# Patient Record
Sex: MALE | URBAN - METROPOLITAN AREA
[De-identification: names, ages, dates, MRNs, and addresses within clinical notes are randomized per-mention and may not be internally consistent; named-entity substitution may affect disease eponyms.]

---

## 2020-05-25 ENCOUNTER — COMMUNICATION - HEALTHEAST (OUTPATIENT)
Dept: SCHEDULING | Facility: CLINIC | Age: 40
End: 2020-05-25

## 2021-06-08 NOTE — TELEPHONE ENCOUNTER
RN Triage:    Patient is calling about COVID-like symptoms    Patient's coworker had a confirmed case of COVID - he was quarantined and is now back to work.    Patient now has some symptoms which include:  -Very mild cough that started about a week ago  - Fever - 100.2 this morning    Says he wasn't feeling well this morning so he took his temp and noticed it was high.    Is not a healthcare employee or  provider.    Denies shortness of breath, headache, sore throat, loss of taste or smell    Does take medications for high blood pressure    PLAN:  Per protocol, patient should monitor his symptoms at home. Patient also requesting testing information. All Care Advice and testing info given. Patient is to call back if his symptoms get worse or if other questions arise.      Rosaline Aleman RN      Reason for Disposition    [1] COVID-19 infection diagnosed or suspected AND [2] mild symptoms (fever, cough) AND [3] no trouble breathing or other complications    [facility] Specific Disposition  - REQUIRED: [facility] Specific Patient Instructions  COVID 19 Nurse Triage Plan/Patient Instructions    Please be aware that novel coronavirus (COVID-19) may be circulating in the community. If you develop symptoms such as fever, cough, or SOB or if you have concerns about the presence of another infection including coronavirus (COVID-19), please contact your health care provider or visit [email or web address].       Patient to stay at home and follow home care protocol based instructions. and Additional COVID19 information to add for patients.       Additional General Information About COVID-19    Whether or not you've been tested for COVID-19    Stay home and away from others (self-isolate) until:  At least 10 days have passed since your symptoms started. And   You've had no fever--and no medicine that reduces fever--for 3 full days (72 hours). And    Your other symptoms have resolved (gotten better).     During  this time:  Stay in your own room (and use your own bathroom), if you can.  Stay away from others in your home. No hugging, kissing or shaking hands.  No visitors.  Don't go to work, school or anywhere else.   Clean  high touch  surfaces often (doorknobs, counters, handles, etc.). Use a household cleaning spray or wipes.  Cover your mouth and nose with a mask, tissue or wash cloth to avoid spreading germs.  Wash your hands and face often. Use soap and water.  For more tips, go to https://www.cdc.gov/coronavirus/2019-ncov/downloads/10Things.pdf.    How can I take care of myself?    Get lots of rest. Drink extra fluids (unless a doctor has told you not to).     Take Tylenol (acetaminophen) for fever or pain. If you have liver or kidney problems, ask your family doctor if it's okay to take Tylenol.     Adults can take either:   650 mg (two 325 mg pills) every 4 to 6 hours, or   1,000 mg (two 500 mg pills) every 8 hours as needed.   Note: Don't take more than 3,000 mg in one day.   Acetaminophen is found in many medicines (both prescribed and over-the-counter medicines). Read all labels to be sure you don't take too much.   For children, check the Tylenol bottle for the right dose. The dose is based on  the child's age or weight.    If you have other health problems (like cancer, heart failure, an organ transplant or severe kidney disease): Call your specialty clinic if you don't feel better in the next 2 days.    Know when to call 911: If your breathing is so bad that it keeps you from doing normal activities, call 911 or go to the emergency room. Tell them that you've been staying home and may have COVID-19..      What are the symptoms of COVID-19?   The most common symptoms are cough, fever and trouble breathing.   Less common symptoms include body aches, chills, diarrhea (loose, watery poops), fatigue (feeling very tired), headache, runny nose, sore throat and loss of smell.   COVID-19 can cause severe coughing  (bronchitis) and lung infection (pneumonia).    How does it spread?   The virus may spread when a person coughs or sneezes into the air. The virus can travel about 6 feet this way, and it can live on surfaces.    Common  (household disinfectants) will kill the virus.    Who is at risk?  Anyone can catch COVID-19 if they're around someone who has the virus.    How can others protect themselves?   Stay away from people who have COVID-19 (or symptoms of COVID-19).  Wash hands often with soap and water. Or, use hand  with at least 60% alcohol.  Avoid touching the eyes, nose or mouth.   Wear a face mask when you go out in public, when sick or when caring for a sick person.      For more about COVID-19 and caring for yourself at home, please visit the CDC website at https://www.cdc.gov/coronavirus/2019-ncov/about/steps-when-sick.html.     To learn about care at [facility], go to [email or web address].    Below are the COVID-19 hotlines at the [place] Department of [facility] ([facility]). Interpreters are available.   For health questions: Call 007-063-5635 or 1-430.637.6477 (7 a.m. to 7 p.m.)  For questions about schools and childcare: Call 552-412-9249 or 1-880.179.8829 (7 a.m. to 7 p.m.)        Thank you for limiting contact with others, wearing a simple mask to cover your cough, practice good hand hygiene habits and accessing our virtual services where possible to limit the spread of this virus.    For more information about COVID19 and options for caring for yourself at home, please visit the CDC website at https://www.cdc.gov/coronavirus/2019-ncov/about/steps-when-sick.html  For more options for care at [facility], please visit our website at [email or web address]    For more information, please use the [place] Department of [facility] ([facility]) COVID-19 Hotlines (Interpreters available):   Health questions: Phone Number: 732.558.5649 or  1-346.487.8957 and Hours: 7 a.m. to 7 p.m.  Schools and  questions: Phone Number: [phone] or 1-893.577.4007 and Hours 7 a.m. to 7 p.m.    Protocols used: CORONAVIRUS (COVID-19) DIAGNOSED OR [id number] 4.22.20

## 2023-09-09 ENCOUNTER — HOSPITAL ENCOUNTER (EMERGENCY)
Facility: CLINIC | Age: 43
Discharge: HOME | End: 2023-09-09
Payer: COMMERCIAL

## 2023-09-09 VITALS
HEART RATE: 59 BPM | RESPIRATION RATE: 14 BRPM | TEMPERATURE: 97.4 F | SYSTOLIC BLOOD PRESSURE: 136 MMHG | DIASTOLIC BLOOD PRESSURE: 87 MMHG | WEIGHT: 195 LBS | OXYGEN SATURATION: 100 %

## 2023-09-09 DIAGNOSIS — K04.7 DENTAL INFECTION: ICD-10-CM

## 2023-09-09 DIAGNOSIS — K08.89 PAIN, DENTAL: ICD-10-CM

## 2023-09-09 PROCEDURE — 99284 EMERGENCY DEPT VISIT MOD MDM: CPT | Mod: 25

## 2023-09-09 PROCEDURE — 250N000013 HC RX MED GY IP 250 OP 250 PS 637

## 2023-09-09 PROCEDURE — 250N000009 HC RX 250

## 2023-09-09 PROCEDURE — 64400 NJX AA&/STRD TRIGEMINAL NRV: CPT

## 2023-09-09 RX ORDER — OXYCODONE HYDROCHLORIDE 5 MG/1
5 TABLET ORAL EVERY 6 HOURS PRN
Qty: 7 TABLET | Refills: 0 | Status: SHIPPED | OUTPATIENT
Start: 2023-09-09 | End: 2023-09-12

## 2023-09-09 RX ORDER — BUPIVACAINE HYDROCHLORIDE AND EPINEPHRINE 5; 5 MG/ML; UG/ML
1.8 INJECTION, SOLUTION PERINEURAL ONCE
Status: COMPLETED | OUTPATIENT
Start: 2023-09-09 | End: 2023-09-09

## 2023-09-09 RX ADMIN — AMOXICILLIN AND CLAVULANATE POTASSIUM 1 TABLET: 875; 125 TABLET, FILM COATED ORAL at 09:56

## 2023-09-09 RX ADMIN — BUPIVACAINE HYDROCHLORIDE AND EPINEPHRINE BITARTRATE 1.8 ML: 5; .005 INJECTION, SOLUTION SUBCUTANEOUS at 09:56

## 2023-09-09 ASSESSMENT — ENCOUNTER SYMPTOMS
TROUBLE SWALLOWING: 0
CHILLS: 0
FEVER: 0

## 2023-09-09 NOTE — DISCHARGE INSTRUCTIONS
You were seen in the ER for dental pain.  You were given a dental injection to help with the pain.  We will send you home on an antibiotic called Augmentin to help with the infection.  Please take this twice a day for 9 days.  Continue to take Tylenol and ibuprofen for the pain, however we will send you home with oxycodone for breakthrough pain.  Ultimately, this will need to be drained by a dentist.  Please continue to try to call your dentist, however we did place a referral for a dentist within the [facility] system, they will hopefully call you to make an appointment.  Otherwise, you may call or go to any of the local clinics listed below.  Follow-up with dentist and return to the ER if you have worsening pain, swelling, fevers, chills, difficulty speaking or swallowing or any other concerning symptoms.    Dental Clinics with no or reduced fees    [facility]   The Dental Emergency Room  [address] [facility], [facility]  [phone] Accepts [place]    [facility]  [address], [facility]  [phone] No Fee Hours and services vary each month - call them before going.  Sometimes only offer extractions.   [facility]  [address], [facility]  [phone] Sliding fee: ER visit - $50 up front  regular - $35 up front Call or arrive at 7:45 AM on Mondays, Tues, or Thursdays to sign up for same-day appointments for dental pain / emergencies.        [facility] Sliding  fee  Call for details Walk-ins and same-day appointments  Walk-in's accepted 8-11AM and 1-4PM  Monday-Friday   [address]     [phone]          [facility] dental Sliding fee If no insurance, call first.    [address], [place]     [phone]          [facility]  [address], [facility]  [phone] Sliding fee Call 8:00 AM Mon-Thurs for next-day  appointments (for emergencies/pain only) Help with [place] is available.        [facility]  [address]  [phone] Call for fees Walk-in appointments available from 8am-3:30pm.  Standard appointments also available.              [facility]  [address]  [address]  [phone] No cost Open Monday- Thursday, 8am-9pm        [facility]   [address]  [phone] Sliding fee  $40 up front No walk-ins. Call at 8AM Mon-Fri for same- day visits.  Can schedule Saturday emergency visits by calling Friday morning.   [facility]  [address]  [phone] Sliding fee  Call for details No walk-ins.  For emergency appointments:  Call on Thursday 3PM (for Friday appt) OR Call on Friday 3PM (for Monday appt)   [facility]  [address] [place] [address]  [phone] Sliding fee- Call for Details Mon, Tues 7am-5:30pm  Wed 7am-8pm  Thurs 7am-7pm  Fri 8am-5pm   [facility]  [address] [place] [address]  [phone] Sliding fee -   Call for details Mon, Tues, Thurs, Fri- 8am-4:30 PM  Wed 8:30 AM to 8 pm             OTHER RESOURCES       Emergency Dental [facility],   [address] [address]  [address] [place]  [address]  [phone]    Referral Service, 1-800-DENTIST        Open 9a to 9p 7 days a week               [employer], 1-197.208.5546 For people who are elderly, disabled, or medically compromised and have no other way to pay for dental care. Call to get an application. If approved, services are provided through volunteer dentists.        REVISED 2018-10

## 2023-09-09 NOTE — ED PROVIDER NOTES
EMERGENCY DEPARTMENT ENCOUNTER      NAME: Chu Olson  AGE: 42 year old male  YOB: 1980  MRN: [id number]  EVALUATION DATE & TIME: 9/9/2023  9:16 AM    PCP: No primary care provider on file.    ED PROVIDER: Abby Lee PA-C    Chief Complaint   Patient presents with    Dental Pain    Facial Swelling     FINAL IMPRESSION:  1. Pain, dental    2. Dental infection      MEDICAL DECISION MAKING:    Pertinent Labs & Imaging studies reviewed. (See chart for details)  Chu Olson is a 42 year old male who presents for evaluation of right lower tooth pain and facial swelling x 1 week. Tried all week to get in to see his dentist, but no appointment available so came to the ER today for further evaluation. No known injury. Has been using Orajel with temporary relief.     On my initial evaluation, vital signs normal. On physical exam patient is awake, alert, no acute distress, resting comfortably at edge of bed.  Does not appear overly uncomfortable appearing, not ill or toxic.  On inspection of his mouth, he has poor dentition with many teeth with crowns and dental caries.  He is tender over his tooth #30 and 31 with surrounding erythema to the gumline.  He does have right-sided mild facial swelling and is tender on palpation, but no erythema or fluctuance.  No difficulty opening or closing jaw.  No tenderness beneath his tongue or mandible.  No difficulty tolerating secretions or speaking.    Differential diagnosis includes dental caries, periapical abscess, deep space abscess, gingivitis, Ludewig's, TMJ, parotitis,.     Suspect this is dental caries with likely periapical abscess ultimately will need drained by a dentist.  No obvious fluctuance that would require I&D here in the ER today.  In the meantime, I offered a dental block, which patient accepted.  Felt immediate relief after alveolar nerve block.  Will send home on Augmentin and given referral for dental clinics for possible walk-in  evaluation.  Patient is otherwise clinically well-appearing, do not suspect deeper space infection or Konstantin's that would require further CT imaging today.  Sent home with Augmentin and oxycodone.  Plan for dental follow-up and provided strict return precautions.    Patient has had serial examinations and notes significant improvement.     Patient was discharged in stable condition with treatment plan as below. Instructed to follow up with primary care provider in 3 days and with dentist and return to the emergency department with any new or worsening of symptoms. Patient expressed understanding, feels comfortable, and is in agreement with this plan. All questions addressed prior to discharge.    Medical Decision Making    History:  Supplemental history from: Family Member/Significant Other  External Record(s) reviewed: Documented in chart, if applicable.    Work Up:  Chart documentation includes differential considered and any EKGs or imaging independently interpreted by provider, where specified.  In additional to work up documented, I considered the following work up: Documented in chart, if applicable.    External consultation:  Discussion of management with another provider: Documented in chart, if applicable    Complicating factors:  Care impacted by chronic illness: N/A  Care affected by social determinants of health: N/A    Disposition considerations: Discharge. I prescribed additional prescription strength medication(s) as charted. N/A.    ED COURSE:  9:17 AM  I reviewed the patient's chart. I met with the patient to gather history and to perform my initial exam.   9:59 AM I rechecked the patient and performed dental block.  We discussed plan for discharge including treatment plan, follow-up and return precautions to emergency department.  Patient voiced understanding and in agreement with this plan.    At the conclusion of the encounter I discussed the results of all of the tests and the disposition. The  "questions were answered. The patient or family acknowledged understanding and was agreeable with the care plan.     Voice recognition software was used in the creation of this note. Any grammatical or nonsensical errors are due to inherent errors with the software and are not the intention of the writer.     MEDICATIONS GIVEN IN THE EMERGENCY:  Medications   BUPivacaine 0.5 % EPINEPHrine 1:200,000 0.5% -1:[id number] dental injection SOLN 1.8 mL (1.8 mLs Intradermal $Given by Other Clinician 9/9/23 0956)   amoxicillin-clavulanate (AUGMENTIN) 875-125 MG per tablet 1 tablet (1 tablet Oral $Given 9/9/23 0956)       NEW PRESCRIPTIONS STARTED AT TODAY'S ER VISIT  New Prescriptions    AMOXICILLIN-CLAVULANATE (AUGMENTIN) 875-125 MG TABLET    Take 1 tablet by mouth 2 times daily for 9 days    OXYCODONE (ROXICODONE) 5 MG TABLET    Take 1 tablet (5 mg) by mouth every 6 hours as needed for pain     =================================================================    HPI:    Patient information was obtained from: Patient and patient's family member    Use of Interpretor: N/A         Chu Olson is a 42 year old male with no pertinent history who presents to this ED by walk in for evaluation of dental pain and gum swelling.    For the past 5 months, the patient has had left bottom tooth pain due to a \"bump\" on his gums and has had difficulty biting down. He was seen by his dentist for this complaint, who said that the \"bump\" would self resolve. The patient states that his left bottom tooth pain has been constant since.     On 9/3/23, the patient noticed his right bottom gums swelling up and endorses constant pain rated an 8 out of 10. He has applied oral gels and ice packs to the area with some pain relief. He denies fever, chills, trouble swallowing or any other complaints at this time. The patient has tried to be seen by a dentist but does not have an appointment scheduled due to dentist unavailability.    REVIEW OF " SYSTEMS:  Review of Systems   Constitutional:  Negative for chills and fever.   HENT:  Positive for dental problem. Negative for trouble swallowing.         Positive for bilateral gum pain and bilateral gum swelling.    All other systems reviewed and are negative.       PAST MEDICAL HISTORY:  History reviewed. No pertinent past medical history.    PAST SURGICAL HISTORY:  History reviewed. No pertinent surgical history.    CURRENT MEDICATIONS:    No current facility-administered medications for this encounter.    Current Outpatient Medications:     amoxicillin-clavulanate (AUGMENTIN) 875-125 MG tablet, Take 1 tablet by mouth 2 times daily for 9 days, Disp: 18 tablet, Rfl: 0    oxyCODONE (ROXICODONE) 5 MG tablet, Take 1 tablet (5 mg) by mouth every 6 hours as needed for pain, Disp: 7 tablet, Rfl: 0    ALLERGIES:  No Known Allergies    FAMILY HISTORY:  History reviewed. No pertinent family history.    SOCIAL HISTORY:        VITALS:  Patient Vitals for the past 24 hrs:   BP Temp Temp src Pulse Resp SpO2 Weight   09/09/23 0914 136/87 97.4  F (36.3  C) Temporal 59 14 100 % 88.5 kg (195 lb)       PHYSICAL EXAM    Constitutional: Well developed, Well nourished, NAD  HENT: Normocephalic, Atraumatic, Bilateral external ears normal,On inspection of his mouth, he has poor dentition with many teeth with crowns and dental caries.  He is tender over his tooth #30 and 31 with surrounding erythema to the gumline.  He does have right-sided mild facial swelling and is tender on palpation, but no erythema or fluctuance.  No difficulty opening or closing jaw.  No tenderness beneath his tongue or mandible.  No difficulty tolerating secretions or speakingmucous membranes moist, Nose normal.   Neck: Normal range of motion, No tenderness, Supple, No stridor.  Eyes: PERRL, EOMI, Conjunctiva normal, No discharge.   Musculoskeletal: 2+ DP pulses. No edema. No cyanosis, No clubbing. Good range of motion in all major joints. No tenderness to  palpation or major deformities noted. No tenderness of the CTLS spine.   Integument: Warm, Dry, No erythema, No rash. No petechiae.  Neurologic: Alert & oriented x 3, Normal motor function, Normal sensory function, No focal deficits noted. Normal gait.  Psychiatric: Affect normal, Judgment normal, Mood normal. Cooperative.    LAB:  All pertinent labs reviewed and interpreted.  Labs Ordered and Resulted from Time of ED Arrival to Time of ED Departure - No data to display    RADIOLOGY:  Reviewed all pertinent imaging. Please see official radiology report.  No orders to display       EKG:    None.    PROCEDURES:   PROCEDURE: Dental Nerve Block   INDICATIONS: Dental pain   PROCEDURE PROVIDER: Abby Lee PA-C   SITE: Right Inferior Alveolar (mandibular) Nerve to achieve anesthesia of Tooth #30/31     MEDICATION: 5 mL of 0.25% Bupivacaine without epinephrine   NOTE: The usual mandibular landmarks were identified and the needle was positioned at the midpoint of the mandibular borders.  Area was aspirated and there was no return of blood.  I then injected the medication into the site.    COMPLICATIONS: Patient tolerated procedure well, without complication     Diagnosis:  1. Pain, dental    2. Dental infection        Charles MOSS, am serving as a scribe to document services personally performed by Abby Lee PA-C based on my observation and the provider's statements to me. I, Abby Lee PA-C attest that Charles Simms is acting in a scribe capacity, has observed my performance of the services and has documented them in accordance with my direction.    Abby Lee PA-C  Emergency Medicine  [facility]  9/9/2023       Abby Lee PA-C  09/09/23 1017

## 2023-09-09 NOTE — ED TRIAGE NOTES
Pt c/o right lower back tooth pain and swelling for 1 week and has not been able to get to dentist pain and swelling getting worse, swelling is noted to right cheek area. Nothing for pain today, denies fevers.